# Patient Record
Sex: FEMALE | Race: WHITE | Employment: UNEMPLOYED | URBAN - NONMETROPOLITAN AREA
[De-identification: names, ages, dates, MRNs, and addresses within clinical notes are randomized per-mention and may not be internally consistent; named-entity substitution may affect disease eponyms.]

---

## 2019-08-21 ENCOUNTER — HOSPITAL ENCOUNTER (EMERGENCY)
Age: 64
Discharge: ANOTHER HOSPITAL | End: 2019-08-21
Attending: EMERGENCY MEDICINE

## 2019-08-21 ENCOUNTER — HOSPITAL ENCOUNTER (INPATIENT)
Age: 64
LOS: 1 days | Discharge: HOME | DRG: 598 | End: 2019-08-22
Attending: SURGERY | Admitting: SURGERY
Payer: COMMERCIAL

## 2019-08-21 ENCOUNTER — APPOINTMENT (OUTPATIENT)
Dept: GENERAL RADIOLOGY | Age: 64
End: 2019-08-21

## 2019-08-21 VITALS
HEART RATE: 78 BPM | WEIGHT: 140 LBS | RESPIRATION RATE: 20 BRPM | HEIGHT: 66 IN | OXYGEN SATURATION: 94 % | DIASTOLIC BLOOD PRESSURE: 78 MMHG | SYSTOLIC BLOOD PRESSURE: 146 MMHG | TEMPERATURE: 97.9 F | BODY MASS INDEX: 22.5 KG/M2

## 2019-08-21 DIAGNOSIS — E86.0 DEHYDRATION: ICD-10-CM

## 2019-08-21 DIAGNOSIS — C79.2 BREAST CANCER METASTASIZED TO SKIN, RIGHT (HCC): ICD-10-CM

## 2019-08-21 DIAGNOSIS — C50.911 BREAST CANCER METASTASIZED TO SKIN, RIGHT (HCC): ICD-10-CM

## 2019-08-21 DIAGNOSIS — R53.1 GENERAL WEAKNESS: Primary | ICD-10-CM

## 2019-08-21 PROBLEM — C50.919 BREAST CANCER IN FEMALE (HCC): Status: ACTIVE | Noted: 2019-08-21

## 2019-08-21 LAB
ABSOLUTE EOS #: 0.1 K/UL (ref 0–0.4)
ABSOLUTE IMMATURE GRANULOCYTE: ABNORMAL K/UL (ref 0–0.3)
ABSOLUTE LYMPH #: 1.1 K/UL (ref 1–4.8)
ABSOLUTE MONO #: 0.6 K/UL (ref 0–1)
ALBUMIN SERPL-MCNC: 4.2 G/DL (ref 3.5–5.2)
ALBUMIN/GLOBULIN RATIO: ABNORMAL (ref 1–2.5)
ALP BLD-CCNC: 132 U/L (ref 35–104)
ALT SERPL-CCNC: 23 U/L (ref 5–33)
ANION GAP SERPL CALCULATED.3IONS-SCNC: 13 MMOL/L (ref 9–17)
AST SERPL-CCNC: 74 U/L
BASOPHILS # BLD: 0 % (ref 0–2)
BASOPHILS ABSOLUTE: 0 K/UL (ref 0–0.2)
BILIRUB SERPL-MCNC: 0.57 MG/DL (ref 0.3–1.2)
BUN BLDV-MCNC: 14 MG/DL (ref 8–23)
BUN/CREAT BLD: 22 (ref 9–20)
CALCIUM SERPL-MCNC: 10.3 MG/DL (ref 8.6–10.4)
CHLORIDE BLD-SCNC: 94 MMOL/L (ref 98–107)
CO2: 26 MMOL/L (ref 20–31)
CREAT SERPL-MCNC: 0.65 MG/DL (ref 0.5–0.9)
DIFFERENTIAL TYPE: YES
EOSINOPHILS RELATIVE PERCENT: 1 % (ref 0–5)
GFR AFRICAN AMERICAN: >60 ML/MIN
GFR NON-AFRICAN AMERICAN: >60 ML/MIN
GFR SERPL CREATININE-BSD FRML MDRD: ABNORMAL ML/MIN/{1.73_M2}
GFR SERPL CREATININE-BSD FRML MDRD: ABNORMAL ML/MIN/{1.73_M2}
GLUCOSE BLD-MCNC: 126 MG/DL (ref 70–99)
HCT VFR BLD CALC: 41.2 % (ref 36–46)
HEMOGLOBIN: 14 G/DL (ref 12–16)
IMMATURE GRANULOCYTES: ABNORMAL %
LYMPHOCYTES # BLD: 15 % (ref 15–40)
MCH RBC QN AUTO: 29.5 PG (ref 26–34)
MCHC RBC AUTO-ENTMCNC: 34 G/DL (ref 31–37)
MCV RBC AUTO: 86.9 FL (ref 80–100)
MONOCYTES # BLD: 7 % (ref 4–8)
NRBC AUTOMATED: ABNORMAL PER 100 WBC
PDW BLD-RTO: 12.4 % (ref 12.1–15.2)
PLATELET # BLD: 435 K/UL (ref 140–450)
PLATELET ESTIMATE: ABNORMAL
PMV BLD AUTO: ABNORMAL FL (ref 6–12)
POTASSIUM SERPL-SCNC: 4.1 MMOL/L (ref 3.7–5.3)
RBC # BLD: 4.74 M/UL (ref 4–5.2)
RBC # BLD: ABNORMAL 10*6/UL
SEG NEUTROPHILS: 77 % (ref 47–75)
SEGMENTED NEUTROPHILS ABSOLUTE COUNT: 5.9 K/UL (ref 2.5–7)
SODIUM BLD-SCNC: 133 MMOL/L (ref 135–144)
TOTAL PROTEIN: 8.1 G/DL (ref 6.4–8.3)
WBC # BLD: 7.6 K/UL (ref 3.5–11)
WBC # BLD: ABNORMAL 10*3/UL

## 2019-08-21 PROCEDURE — 2580000003 HC RX 258: Performed by: SURGERY

## 2019-08-21 PROCEDURE — 2580000003 HC RX 258: Performed by: EMERGENCY MEDICINE

## 2019-08-21 PROCEDURE — 1200000000 HC SEMI PRIVATE

## 2019-08-21 PROCEDURE — 85025 COMPLETE CBC W/AUTO DIFF WBC: CPT

## 2019-08-21 PROCEDURE — 99284 EMERGENCY DEPT VISIT MOD MDM: CPT

## 2019-08-21 PROCEDURE — 96360 HYDRATION IV INFUSION INIT: CPT

## 2019-08-21 PROCEDURE — 80053 COMPREHEN METABOLIC PANEL: CPT

## 2019-08-21 PROCEDURE — 6360000002 HC RX W HCPCS: Performed by: SURGERY

## 2019-08-21 PROCEDURE — 71045 X-RAY EXAM CHEST 1 VIEW: CPT

## 2019-08-21 PROCEDURE — 36415 COLL VENOUS BLD VENIPUNCTURE: CPT

## 2019-08-21 RX ORDER — SODIUM CHLORIDE 0.9 % (FLUSH) 0.9 %
10 SYRINGE (ML) INJECTION PRN
Status: DISCONTINUED | OUTPATIENT
Start: 2019-08-21 | End: 2019-08-22 | Stop reason: HOSPADM

## 2019-08-21 RX ORDER — ONDANSETRON 2 MG/ML
4 INJECTION INTRAMUSCULAR; INTRAVENOUS EVERY 6 HOURS PRN
Status: DISCONTINUED | OUTPATIENT
Start: 2019-08-21 | End: 2019-08-22 | Stop reason: HOSPADM

## 2019-08-21 RX ORDER — SODIUM CHLORIDE 9 MG/ML
INJECTION, SOLUTION INTRAVENOUS CONTINUOUS
Status: DISCONTINUED | OUTPATIENT
Start: 2019-08-21 | End: 2019-08-22 | Stop reason: HOSPADM

## 2019-08-21 RX ORDER — SODIUM CHLORIDE 0.9 % (FLUSH) 0.9 %
10 SYRINGE (ML) INJECTION EVERY 12 HOURS SCHEDULED
Status: DISCONTINUED | OUTPATIENT
Start: 2019-08-21 | End: 2019-08-22 | Stop reason: HOSPADM

## 2019-08-21 RX ORDER — 0.9 % SODIUM CHLORIDE 0.9 %
1000 INTRAVENOUS SOLUTION INTRAVENOUS ONCE
Status: COMPLETED | OUTPATIENT
Start: 2019-08-21 | End: 2019-08-21

## 2019-08-21 RX ADMIN — SODIUM CHLORIDE: 9 INJECTION, SOLUTION INTRAVENOUS at 23:57

## 2019-08-21 RX ADMIN — SODIUM CHLORIDE: 9 INJECTION, SOLUTION INTRAVENOUS at 16:24

## 2019-08-21 RX ADMIN — ENOXAPARIN SODIUM 40 MG: 40 INJECTION SUBCUTANEOUS at 16:27

## 2019-08-21 RX ADMIN — SODIUM CHLORIDE 1000 ML: 9 INJECTION, SOLUTION INTRAVENOUS at 09:15

## 2019-08-21 SDOH — HEALTH STABILITY: MENTAL HEALTH: HOW OFTEN DO YOU HAVE A DRINK CONTAINING ALCOHOL?: NEVER

## 2019-08-21 ASSESSMENT — PAIN SCALES - GENERAL
PAINLEVEL_OUTOF10: 0
PAINLEVEL_OUTOF10: 0

## 2019-08-21 NOTE — H&P
lymph nodes of right axilla  Respiratory ROS: No SOB, Pneumoniae,Cough, or Pulmonary Embolism History  Cardiovascular ROS: No Chest Pain with Exertion, Palpitations, Syncope, Edema, Arrhythmia  Gastrointestinal ROS: No Indigestion, Heartburn, Nausea, vomiting, Diarrhea, Constipation,or Bowel Changes; No Bloody Stools or melena  Genito-Urinary ROS: No Dysuria, Hematuria or Nocturia.  No Urinary Incontinence or Vaginal Discharge  Musculoskeletal ROS: No Arthralgia, Arthritis,Gout,Osteoporosis or Rheumatism  Neurological ROS: No CVA, Migraines, Epilepsy, Seizure Hx, or Limb Weakness  Dermatological ROS: right chest wall lesions consistent with locally advanced breast cancer    PHYSICAL EXAM:    VITALS:  BP (!) 142/77   Pulse 75   Temp 98.6 °F (37 °C) (Oral)   Resp 18   Ht 5' 6\" (1.676 m)   Wt 140 lb 10.5 oz (63.8 kg)   SpO2 96%   BMI 22.70 kg/m²   INTAKE/OUTPUT:   No intake or output data in the 24 hours ending 08/21/19 1833    CONSTITUTIONAL:  awake, alert, not distressed and normal weight  Head:  normocephalic/atraumatic, without obvious abnormality  Eyes: PERRL, Sclera non icteric  Mouth: dentition is good, mucous membranes moist and pink  NECK:  supple, symmetrical, trachea midline, no lymphadenopathy, no thyroid nodules, no carotid bruits  LUNGS:  CTA bilaterally, no ronchi, rales, or wheezes, no intercostal muscle retractions or accessory muscle use  CARDIOVASCULAR:  regular rate and rhythm and No Murmur, rub,  Or gallops  ABDOMEN: soft,  No surgical scars, present,  non distended,  No hepato-organomegaly non-tender, no guarding present, no masses and no hernias  MUSCULOSKELETAL:  negative, there is not obvious somatic dysfunction, gait normal  NEUROLOGIC:  Mental Status Exam:  Level of Alertness:   alert  Orientation:   oriented to person, place, and time  pshych judgement and insight is normal  Breast: Right breast fungating mass with pinpoint bleeding, discharge, nipple retraction, fixed firm matted

## 2019-08-22 ENCOUNTER — APPOINTMENT (OUTPATIENT)
Dept: CT IMAGING | Age: 64
DRG: 598 | End: 2019-08-22
Attending: SURGERY

## 2019-08-22 VITALS
WEIGHT: 140.65 LBS | HEIGHT: 66 IN | SYSTOLIC BLOOD PRESSURE: 152 MMHG | RESPIRATION RATE: 16 BRPM | OXYGEN SATURATION: 96 % | DIASTOLIC BLOOD PRESSURE: 80 MMHG | BODY MASS INDEX: 22.6 KG/M2 | HEART RATE: 74 BPM | TEMPERATURE: 98.4 F

## 2019-08-22 PROBLEM — E43 SEVERE MALNUTRITION (HCC): Status: ACTIVE | Noted: 2019-08-22

## 2019-08-22 LAB
% CKMB: 1.3 % (ref 0–3)
ABSOLUTE EOS #: 0.1 K/UL (ref 0–0.4)
ABSOLUTE EOS #: 0.1 K/UL (ref 0–0.4)
ABSOLUTE IMMATURE GRANULOCYTE: ABNORMAL K/UL (ref 0–0.3)
ABSOLUTE IMMATURE GRANULOCYTE: ABNORMAL K/UL (ref 0–0.3)
ABSOLUTE LYMPH #: 1.3 K/UL (ref 1–4.8)
ABSOLUTE LYMPH #: 1.5 K/UL (ref 1–4.8)
ABSOLUTE MONO #: 0.6 K/UL (ref 0.1–1.3)
ABSOLUTE MONO #: 0.7 K/UL (ref 0.1–1.3)
ALBUMIN SERPL-MCNC: 3.9 G/DL (ref 3.5–5.2)
ALBUMIN/GLOBULIN RATIO: ABNORMAL (ref 1–2.5)
ALP BLD-CCNC: 113 U/L (ref 35–104)
ALT SERPL-CCNC: 21 U/L (ref 5–33)
ANION GAP SERPL CALCULATED.3IONS-SCNC: 16 MMOL/L (ref 9–17)
ANION GAP SERPL CALCULATED.3IONS-SCNC: 16 MMOL/L (ref 9–17)
AST SERPL-CCNC: 73 U/L
BASOPHILS # BLD: 1 % (ref 0–2)
BASOPHILS # BLD: 1 % (ref 0–2)
BASOPHILS ABSOLUTE: 0 K/UL (ref 0–0.2)
BASOPHILS ABSOLUTE: 0.1 K/UL (ref 0–0.2)
BILIRUB SERPL-MCNC: 0.51 MG/DL (ref 0.3–1.2)
BUN BLDV-MCNC: 11 MG/DL (ref 8–23)
BUN BLDV-MCNC: 11 MG/DL (ref 8–23)
BUN/CREAT BLD: ABNORMAL (ref 9–20)
BUN/CREAT BLD: ABNORMAL (ref 9–20)
CALCIUM SERPL-MCNC: 9.2 MG/DL (ref 8.6–10.4)
CALCIUM SERPL-MCNC: 9.4 MG/DL (ref 8.6–10.4)
CHLORIDE BLD-SCNC: 97 MMOL/L (ref 98–107)
CHLORIDE BLD-SCNC: 98 MMOL/L (ref 98–107)
CK MB: 1.3 NG/ML
CKMB INTERPRETATION: ABNORMAL
CO2: 21 MMOL/L (ref 20–31)
CO2: 22 MMOL/L (ref 20–31)
CREAT SERPL-MCNC: 0.52 MG/DL (ref 0.5–0.9)
CREAT SERPL-MCNC: 0.59 MG/DL (ref 0.5–0.9)
DIFFERENTIAL TYPE: ABNORMAL
DIFFERENTIAL TYPE: ABNORMAL
EOSINOPHILS RELATIVE PERCENT: 1 % (ref 0–4)
EOSINOPHILS RELATIVE PERCENT: 1 % (ref 0–4)
GFR AFRICAN AMERICAN: >60 ML/MIN
GFR AFRICAN AMERICAN: >60 ML/MIN
GFR NON-AFRICAN AMERICAN: >60 ML/MIN
GFR NON-AFRICAN AMERICAN: >60 ML/MIN
GFR SERPL CREATININE-BSD FRML MDRD: ABNORMAL ML/MIN/{1.73_M2}
GLUCOSE BLD-MCNC: 101 MG/DL (ref 70–99)
GLUCOSE BLD-MCNC: 105 MG/DL (ref 70–99)
GLUCOSE BLD-MCNC: 110 MG/DL (ref 65–105)
HCT VFR BLD CALC: 40.8 % (ref 36–46)
HCT VFR BLD CALC: 41.6 % (ref 36–46)
HEMOGLOBIN: 14 G/DL (ref 12–16)
HEMOGLOBIN: 14.1 G/DL (ref 12–16)
IMMATURE GRANULOCYTES: ABNORMAL %
IMMATURE GRANULOCYTES: ABNORMAL %
INR BLD: 1
LYMPHOCYTES # BLD: 18 % (ref 24–44)
LYMPHOCYTES # BLD: 18 % (ref 24–44)
MCH RBC QN AUTO: 29.3 PG (ref 26–34)
MCH RBC QN AUTO: 29.8 PG (ref 26–34)
MCHC RBC AUTO-ENTMCNC: 33.8 G/DL (ref 31–37)
MCHC RBC AUTO-ENTMCNC: 34.2 G/DL (ref 31–37)
MCV RBC AUTO: 86.8 FL (ref 80–100)
MCV RBC AUTO: 87.3 FL (ref 80–100)
MONOCYTES # BLD: 8 % (ref 1–7)
MONOCYTES # BLD: 9 % (ref 1–7)
MYOGLOBIN: 33 NG/ML (ref 25–58)
NRBC AUTOMATED: ABNORMAL PER 100 WBC
NRBC AUTOMATED: ABNORMAL PER 100 WBC
PARTIAL THROMBOPLASTIN TIME: 29 SEC (ref 24–36)
PDW BLD-RTO: 12.6 % (ref 11.5–14.9)
PDW BLD-RTO: 12.9 % (ref 11.5–14.9)
PLATELET # BLD: 381 K/UL (ref 150–450)
PLATELET # BLD: 423 K/UL (ref 150–450)
PLATELET ESTIMATE: ABNORMAL
PLATELET ESTIMATE: ABNORMAL
PMV BLD AUTO: 6.9 FL (ref 6–12)
PMV BLD AUTO: 7.4 FL (ref 6–12)
POTASSIUM SERPL-SCNC: 3.8 MMOL/L (ref 3.7–5.3)
POTASSIUM SERPL-SCNC: 4 MMOL/L (ref 3.7–5.3)
PROTHROMBIN TIME: 12.8 SEC (ref 11.8–14.6)
RBC # BLD: 4.68 M/UL (ref 4–5.2)
RBC # BLD: 4.79 M/UL (ref 4–5.2)
RBC # BLD: ABNORMAL 10*6/UL
RBC # BLD: ABNORMAL 10*6/UL
SEG NEUTROPHILS: 71 % (ref 36–66)
SEG NEUTROPHILS: 72 % (ref 36–66)
SEGMENTED NEUTROPHILS ABSOLUTE COUNT: 5.5 K/UL (ref 1.3–9.1)
SEGMENTED NEUTROPHILS ABSOLUTE COUNT: 6 K/UL (ref 1.3–9.1)
SODIUM BLD-SCNC: 134 MMOL/L (ref 135–144)
SODIUM BLD-SCNC: 136 MMOL/L (ref 135–144)
TOTAL CK: 98 U/L (ref 26–192)
TOTAL PROTEIN: 7.1 G/DL (ref 6.4–8.3)
TROPONIN INTERP: ABNORMAL
TROPONIN T: ABNORMAL NG/ML
TROPONIN, HIGH SENSITIVITY: 7 NG/L (ref 0–14)
WBC # BLD: 7.6 K/UL (ref 3.5–11)
WBC # BLD: 8.4 K/UL (ref 3.5–11)
WBC # BLD: ABNORMAL 10*3/UL
WBC # BLD: ABNORMAL 10*3/UL

## 2019-08-22 PROCEDURE — 84484 ASSAY OF TROPONIN QUANT: CPT

## 2019-08-22 PROCEDURE — 85025 COMPLETE CBC W/AUTO DIFF WBC: CPT

## 2019-08-22 PROCEDURE — 85730 THROMBOPLASTIN TIME PARTIAL: CPT

## 2019-08-22 PROCEDURE — 99255 IP/OBS CONSLTJ NEW/EST HI 80: CPT | Performed by: INTERNAL MEDICINE

## 2019-08-22 PROCEDURE — 2580000003 HC RX 258: Performed by: SURGERY

## 2019-08-22 PROCEDURE — 6360000002 HC RX W HCPCS: Performed by: SURGERY

## 2019-08-22 PROCEDURE — 80048 BASIC METABOLIC PNL TOTAL CA: CPT

## 2019-08-22 PROCEDURE — 82553 CREATINE MB FRACTION: CPT

## 2019-08-22 PROCEDURE — 85610 PROTHROMBIN TIME: CPT

## 2019-08-22 PROCEDURE — 80053 COMPREHEN METABOLIC PANEL: CPT

## 2019-08-22 PROCEDURE — 6360000002 HC RX W HCPCS: Performed by: INTERNAL MEDICINE

## 2019-08-22 PROCEDURE — 83874 ASSAY OF MYOGLOBIN: CPT

## 2019-08-22 PROCEDURE — 70450 CT HEAD/BRAIN W/O DYE: CPT

## 2019-08-22 PROCEDURE — 82550 ASSAY OF CK (CPK): CPT

## 2019-08-22 PROCEDURE — 74177 CT ABD & PELVIS W/CONTRAST: CPT

## 2019-08-22 PROCEDURE — 82947 ASSAY GLUCOSE BLOOD QUANT: CPT

## 2019-08-22 PROCEDURE — 36415 COLL VENOUS BLD VENIPUNCTURE: CPT

## 2019-08-22 PROCEDURE — 6360000004 HC RX CONTRAST MEDICATION: Performed by: SURGERY

## 2019-08-22 PROCEDURE — 99223 1ST HOSP IP/OBS HIGH 75: CPT | Performed by: INTERNAL MEDICINE

## 2019-08-22 RX ORDER — DEXAMETHASONE SODIUM PHOSPHATE 4 MG/ML
4 INJECTION, SOLUTION INTRA-ARTICULAR; INTRALESIONAL; INTRAMUSCULAR; INTRAVENOUS; SOFT TISSUE ONCE
Status: COMPLETED | OUTPATIENT
Start: 2019-08-22 | End: 2019-08-22

## 2019-08-22 RX ORDER — DEXAMETHASONE 4 MG/1
4 TABLET ORAL EVERY 6 HOURS
Qty: 60 TABLET | Refills: 0 | Status: SHIPPED | OUTPATIENT
Start: 2019-08-22 | End: 2019-09-06

## 2019-08-22 RX ORDER — 0.9 % SODIUM CHLORIDE 0.9 %
80 INTRAVENOUS SOLUTION INTRAVENOUS ONCE
Status: COMPLETED | OUTPATIENT
Start: 2019-08-22 | End: 2019-08-22

## 2019-08-22 RX ORDER — SODIUM CHLORIDE 0.9 % (FLUSH) 0.9 %
10 SYRINGE (ML) INJECTION PRN
Status: DISCONTINUED | OUTPATIENT
Start: 2019-08-22 | End: 2019-08-22 | Stop reason: HOSPADM

## 2019-08-22 RX ORDER — ONDANSETRON 4 MG/1
4 TABLET, FILM COATED ORAL DAILY PRN
Qty: 30 TABLET | Refills: 0 | Status: SHIPPED | OUTPATIENT
Start: 2019-08-22

## 2019-08-22 RX ADMIN — Medication 10 ML: at 14:28

## 2019-08-22 RX ADMIN — Medication 10 ML: at 08:39

## 2019-08-22 RX ADMIN — IOVERSOL 100 ML: 741 INJECTION INTRA-ARTERIAL; INTRAVENOUS at 08:39

## 2019-08-22 RX ADMIN — ONDANSETRON 4 MG: 2 INJECTION INTRAMUSCULAR; INTRAVENOUS at 14:28

## 2019-08-22 RX ADMIN — SODIUM CHLORIDE 80 ML: 9 INJECTION, SOLUTION INTRAVENOUS at 08:39

## 2019-08-22 RX ADMIN — DEXAMETHASONE SODIUM PHOSPHATE 4 MG: 4 INJECTION, SOLUTION INTRAMUSCULAR; INTRAVENOUS at 14:28

## 2019-08-22 ASSESSMENT — ENCOUNTER SYMPTOMS
VOMITING: 0
EYE PAIN: 0
CONSTIPATION: 0
ABDOMINAL DISTENTION: 0
BACK PAIN: 0
TROUBLE SWALLOWING: 0
EYE DISCHARGE: 0
COLOR CHANGE: 1
DIARRHEA: 0
EYE REDNESS: 0
ABDOMINAL PAIN: 0
SHORTNESS OF BREATH: 0
COUGH: 0
NAUSEA: 0
WHEEZING: 0
SORE THROAT: 0

## 2019-08-22 ASSESSMENT — PAIN SCALES - GENERAL: PAINLEVEL_OUTOF10: 0

## 2019-08-22 NOTE — PROGRESS NOTES
.. PALLIATIVE CARE TEAM    Patient: Bronson Acuna  Room: 2087/2087-01    Reason For Consult   Goals of care evaluation  Distress management  Symptom Management  Guidance and support  Facilitate communications  Assistance in coordinating care  Recommendations for the above    Impression: Bronson Acuna is a 71y.o. year old female with  has no past medical history on file. .  Currently hospitalized for the management of breast mass. The Palliative Care Team is following to assist with goals of care, patient/ family support. Code Status  DNR-CC    Vital Signs:  BP (!) 152/80   Pulse 74   Temp 98.4 °F (36.9 °C) (Oral)   Resp 16   Ht 5' 6\" (1.676 m)   Wt 140 lb 10.5 oz (63.8 kg)   SpO2 96%   BMI 22.70 kg/m²     Patient Active Problem List   Diagnosis    Breast cancer in [facility])       Palliative Interaction:The patient is in bed, and is alert and pleasant. Her  Ricardo Malik , son and daughter in law are at the bedside. They are from [facility], and the patient was transferred here to [facility] with right breast CA, weakness and vomiting. Several events have evolved including a rapid response with the patient transferring to the progressive unit today. The cancer is metastatic and is very involved, and the family is looking to take care of her at home, and get Hospice care involved. I did reach out to the Manolo FERNANDEZ in [address] and he recommended [facility] out of [facility]. I did talk with  Ricardo Malik and he is looking at another Hospice agency as well. I gave him the information on [facility] and the contact information. He was not interested in me contacting them as of yet. I will follow up with the family. I offered much emotional support. Will follow along as needed.      Goals/Plan of care  Education/support to family  Education/support to patient  Discharge planning/helping to coordinate care  Providing support for coping/adaptation/distress of family  Providing support for coping/adaptation/distress of patient  Continue with current plan of care  Code status clarified: [facility]  Palliative care orders introduced  Provided information about hospice  Continued communication updates  Patient to be discharged to home and have hime hospice care near their [facility]. Will follow for support as needed.      Electronically signed by   Flory Yao RN  Palliative Care Team  on 8/22/2019 at 1:51 PM

## 2019-08-22 NOTE — FLOWSHEET NOTE
Writer responded to RRT/Stroke Alert, room 2049  Prayer at bedside with patient and family prior to CT  Patient & family now in 2087 08/22/19 1210   Encounter Summary   Services provided to: Family; Patient and family together   Support System Spouse; Children;Family members   Continue Visiting   (8/22/19)   Complexity of Encounter Moderate   Length of Encounter 45 minutes   Spiritual Assessment Completed Yes   Crisis   Type Rapid response;Stroke Alert   Assessment Hopeful;Coping; Approachable; Anxious   Intervention Active listening;Discussed relationship with God;Discussed illness/injury and it's impact; Explored feelings, thoughts, concerns;Nurtured hope;Prayer   Outcome Receptive;Encouraged; Hopeful;Comfort;Expressed gratitude;Engaged in conversation; Shared reminiscences; Expressed feelings/needs/concerns

## 2019-08-22 NOTE — DISCHARGE SUMMARY
Surgery Discharge Summary     Patient Identification  Christa Knott is a 71 y.o. female. :  1955  Admit Date:  2019    Discharge date:   No discharge date for patient encounter. Disposition: home    Discharge Diagnoses:   Patient Active Problem List   Diagnosis    Breast cancer in female [facility])         Consults: oncology    Surgery: none    Patient Instructions: Activity: no heavy lifting, pushing, pulling for 6 weeks, no driving for 2 weeks or while on analgesics  Diet: As tolerated  Follow-up with your PCP and oncology in 1 weeks. See pre-printed instructions in chart and given to patient upon discharge. Discharge Medications: There are no discharge medications for this patient. HPI and Hospital Course: Pt is admitted, transfer from [facility] due to weakness, fatigue due to metastatic breast cancer. CT abdomen/ pelvis were performed revealing diffusely metastatic disease. Pt and family would like to take the patient home today and follow up outpatient to discuss treatment. She refuses ECF and states that she has 7 children who can provider her 24 hour care.         Electronically signed by Jessica Arzola DO on 2019 at 11:06 AM

## 2019-08-22 NOTE — PROGRESS NOTES
nipple retraction, fixed firm matted right axillary lymph nodes, left breast no palpable mass  Skin: right chest wall lesions consistent with locally advanced breast cancer    Data:  CBC with Differential:    Lab Results   Component Value Date    WBC 7.6 08/22/2019    RBC 4.68 08/22/2019    HGB 14.0 08/22/2019    HCT 40.8 08/22/2019     08/22/2019    MCV 87.3 08/22/2019    MCH 29.8 08/22/2019    MCHC 34.2 08/22/2019    RDW 12.9 08/22/2019    LYMPHOPCT 18 08/22/2019    MONOPCT 8 08/22/2019    BASOPCT 1 08/22/2019    MONOSABS 0.60 08/22/2019    LYMPHSABS 1.30 08/22/2019    EOSABS 0.10 08/22/2019    BASOSABS 0.10 08/22/2019    DIFFTYPE NOT REPORTED 08/22/2019     BMP:    Lab Results   Component Value Date     08/22/2019    K 4.0 08/22/2019    CL 98 08/22/2019    CO2 22 08/22/2019    BUN 11 08/22/2019    LABALBU 3.9 08/22/2019    CREATININE 0.59 08/22/2019    CALCIUM 9.2 08/22/2019    GFRAA >60 08/22/2019    LABGLOM >60 08/22/2019    GLUCOSE 101 08/22/2019     Hepatic Function Panel:    Lab Results   Component Value Date    ALKPHOS 113 08/22/2019    ALT 21 08/22/2019    AST 73 08/22/2019    PROT 7.1 08/22/2019    BILITOT 0.51 08/22/2019    LABALBU 3.9 08/22/2019       Radiology Review:  Xr Chest Portable    Result Date: 8/21/2019  EXAM: Portable chest REASON FOR EXAM: Generalized weakness. History of right breast cancer. TECHNIQUE: A portable frontal view of the chest was obtained. COMPARISON: None. FINDINGS: The lungs are well-inflated and clear. The heart and mediastinum are normal. There is no mass or pathologic adenopathy. Osseous structures are normal.     No acute cardiopulmonary process. Ct Chest Abdomen Pelvis W Contrast    Result Date: 8/22/2019  EXAMINATION: CT OF THE CHEST, ABDOMEN, AND PELVIS WITH CONTRAST 8/22/2019 8:25 am TECHNIQUE: CT of the chest, abdomen and pelvis was performed with the administration of intravenous contrast. Multiplanar reformatted images are provided for review. Dose modulation, iterative reconstruction, and/or weight based adjustment of the mA/kV was utilized to reduce the radiation dose to as low as reasonably achievable. COMPARISON: Chest x-ray obtained 1 day prior HISTORY: ORDERING SYSTEM PROVIDED HISTORY: chest wall mass TECHNOLOGIST PROVIDED HISTORY: Reason for Exam: elevated alk phos, evaluate for cancer metastasis, chest wall mass Acuity: Unknown Type of Exam: Unknown FINDINGS: Chest: Mediastinum: The heart is normal in size. There is no pericardial effusion. The thoracic aorta is normal in course and caliber. Mediastinal lymphadenopathy measures up to 1.6 x 2.1 cm in the right paratracheal region, 1.7 x 1.9 cm in the high mediastinum on the left and 1.7 x 2.9 cm in the AP window. There is bilateral hilar lymphadenopathy, measuring up to 1.7 x 2.0 cm on the right and 1.4 x 2.1 cm on the left. Lungs/pleura: There is dependent atelectasis bilaterally. No focal airspace consolidation, pleural effusion or pneumothorax is demonstrated. There are a few scattered pulmonary nodules, which measure up to 10 x 10 mm in the right middle lobe on axial image 79, 10 x 11 mm in the apical right lower lobe on axial image 47, and 8 x 10 mm in the left lower lobe on axial image 58. Soft Tissues/Bones: There are multiple masses in the right breast, the largest measuring 3.9 x 8.3 cm. There is significant masslike skin thickening of the right breast.  Findings are compatible with extensive metastatic disease. There are enlarged right axillary, subpectoral and infraclavicular lymph nodes, which measure up to 2.1 x 3.5 cm there are partially visualized supraclavicular lymph nodes on the right, which measure up to 7 mm in size, which are presumably metastatic. There is an ill-defined sclerotic lesion within the T1 vertebral body, which is most likely due to metastatic disease. There are bilateral thyroid nodules, which measure up to 1.7 x 2.3 cm on the right.  Abdomen/Pelvis:

## 2019-08-22 NOTE — CONSULTS
History:   reports that she has never smoked. She has never used smokeless tobacco. She reports that she does not drink alcohol or use drugs. Medications:    Reviewed in EPIC     Allergies:  Patient has no known allergies. REVIEW OF SYSTEMS:      Review of Systems   Constitutional: Positive for fatigue. Negative for activity change, appetite change, chills and fever. HENT: Negative for mouth sores, sore throat and trouble swallowing. Eyes: Negative for pain, discharge and redness. Respiratory: Negative for cough, shortness of breath and wheezing. Cardiovascular: Negative for chest pain, palpitations and leg swelling. Gastrointestinal: Negative for abdominal distention, abdominal pain, constipation, diarrhea, nausea and vomiting. Endocrine: Negative for cold intolerance and heat intolerance. Genitourinary: Negative for difficulty urinating, dysuria and hematuria. Musculoskeletal: Negative for arthralgias, back pain and neck stiffness. Skin: Positive for color change and rash. Enlarging right-sided breast mass with drainage and foul smell. Erythema in the chest wall around the breast area. Neurological: Negative for dizziness, light-headedness and headaches. Hematological: Negative for adenopathy. Does not bruise/bleed easily. Psychiatric/Behavioral: Negative for agitation, behavioral problems and confusion. PHYSICAL EXAM:      BP (!) 149/85   Pulse 80   Temp 99 °F (37.2 °C) (Oral)   Resp 16   Ht 5' 6\" (1.676 m)   Wt 140 lb 10.5 oz (63.8 kg)   SpO2 94%   BMI 22.70 kg/m²     Temp (24hrs), Av.2 °F (36.8 °C), Min:97.2 °F (36.2 °C), Max:99 °F (37.2 °C)      Physical Exam   Constitutional: She is oriented to person, place, and time. She appears well-developed and well-nourished. No distress. HENT:   Head: Normocephalic and atraumatic. Mouth/Throat: No oropharyngeal exudate. Eyes: Pupils are equal, round, and reactive to light.  EOM are normal. No

## 2019-08-22 NOTE — PLAN OF CARE
Nutrition Problem: Inadequate oral intake  Intervention: Food and/or Nutrient Delivery: Continue current diet, Start ONS  Nutritional Goals: PO intakes are greater than 75% at meals.

## 2019-08-22 NOTE — PROGRESS NOTES
Dr. Jayda Tenorio spoke with  and children via telehealth with Merissa Kidd RN, Rupesh Álvarez RN, and this writer.  The family do not wish to have MRI brain done and do not feel the need to see a Radiation Oncologist. Electronically signed by Cortney Jordan RN on 8/22/2019 at 12:50 PM

## 2019-08-22 NOTE — CONSULTS
JVP, no carotid bruit  Abdomen: Soft, non-tender; no masses or HSM,   Extremities: no edema; no digital cyanosis or clubbing. Musculoskeletal NO joint effusion or synovitis  Skin: No rash or ulcers  Neurologic: Cranial nerves II-XII grossly intact; no motor deficit. Psych: comfortable   NO lymphadenopathy            Relevant Labs/Imaging     CBC:   Recent Labs     08/22/19  1136   WBC 8.4   HGB 14.1        BMP:    Recent Labs     08/21/19  0910 08/22/19  0626 08/22/19  1136   * 136 134*   K 4.1 4.0 3.8   CL 94* 98 97*   CO2 26 22 21   BUN 14 11 11   CREATININE 0.65 0.59 0.52   GLUCOSE 126* 101* 105*     S. Calcium:  Recent Labs     08/22/19  1136   CALCIUM 9.4     Glycosylated hemoglobin A1C: No results for input(s): LABA1C in the last 72 hours. BNP:No results for input(s): BNP in the last 72 hours. Assessment / Plan      Patient Active Problem List   Diagnosis    Breast cancer in female [facility])       A/P  Advanced breast ca with brain mets   Plan dc to hospice   Started on dexamethasone   Detailed discussion with family   To discharge today with decadron and zofran   All questions answered     MD CECILE Figueredo[address], [address] [place] [address].    Phone [phone]   Fax: [phone]  Answering Service: [phone]

## 2019-08-23 ENCOUNTER — TELEPHONE (OUTPATIENT)
Dept: ONCOLOGY | Age: 64
End: 2019-08-23

## 2020-03-29 NOTE — TELEPHONE ENCOUNTER
LM with pt to call and schedule hospital follow up with Dr. Sarita William. Pt was discharged from [facility] on 8/21/19. Had consult with Dr. Joycelyn Dahl in house at [facility].
persistent left leg weakness